# Patient Record
Sex: MALE | Race: WHITE | URBAN - METROPOLITAN AREA
[De-identification: names, ages, dates, MRNs, and addresses within clinical notes are randomized per-mention and may not be internally consistent; named-entity substitution may affect disease eponyms.]

---

## 2017-08-22 ENCOUNTER — APPOINTMENT (OUTPATIENT)
Dept: PHYSICAL THERAPY | Age: 57
End: 2017-08-22

## 2017-08-25 ENCOUNTER — HOSPITAL ENCOUNTER (OUTPATIENT)
Dept: PHYSICAL THERAPY | Age: 57
End: 2017-08-25

## 2017-10-03 ENCOUNTER — HOSPITAL ENCOUNTER (OUTPATIENT)
Dept: PHYSICAL THERAPY | Age: 57
Discharge: HOME | End: 2017-10-03
Payer: OTHER GOVERNMENT

## 2017-10-03 PROCEDURE — 97161 PT EVAL LOW COMPLEX 20 MIN: CPT

## 2017-10-03 PROCEDURE — 97110 THERAPEUTIC EXERCISES: CPT

## 2017-10-03 NOTE — PROGRESS NOTES
Valencia Muñoz  : 1960 [facility] at [facility], [address] [facility], [address]  [phone]   [phone]              OUTPATIENT PHYSICAL THERAPY:Initial Assessment 10/3/2017    ICD-10: Treatment Diagnosis:   Pain in right hip (M25.551)  Other abnormalities of gait and mobility (R26.89)   Stiffness of right hip, not elsewhere classified (M25.651)     Low back pain (M54.5)      Precautions/Allergies:   Review of patient's allergies indicates no known allergies. Fall Risk Score: 3 (? 5 = High Risk)  MD Orders: Eval and Treat  MEDICAL/REFERRING DIAGNOSIS:  Pain in unspecified hip [M25.559]  Low back pain [M54.5]   DATE OF ONSET: long term with exacerbation over the past 7-8 years  REFERRING PHYSICIAN: Kimberly Dan MD  RETURN PHYSICIAN APPOINTMENT: TBD by patient      INITIAL ASSESSMENT:  Valencia Muñoz has attended 1 physical therapy session including initial evaluation. Valencia Muñzo presents with S/S of decreased ROM, decreased strength, impaired gait, impaired stairs, impaired transfers, and sleep disruption due to right hip pain. Valencia Muñoz will benefit from skilled PT (medically necessary) to address above deficits affecting participation in basic ADLs and overall functional tolerance. PROBLEM LIST (Impacting functional limitations):  1. Decreased Strength  2. Decreased ADL/Functional Activities  3. Decreased Transfer Abilities  4. Decreased Ambulation Ability/Technique  5. Decreased Balance  6. Increased Pain  7. Decreased Activity Tolerance  8. Decreased Flexibility/Joint Mobility  9. Decreased Knowledge of Precautions  10. Decreased [place] with Home Exercise Program INTERVENTIONS PLANNED:  1. Balance Exercise  2. Bed Mobility  3. Cold  4. Cryotherapy  5. Electrical Stimulation  6. Family Education  7. Gait Training  8. Heat  9. Home Exercise Program (HEP)  10.  Manual Therapy  11. Neuromuscular Re-education/Strengthening  12. Range of Motion (ROM)  13. Therapeutic Activites  14. Therapeutic Exercise/Strengthening  15. Transfer Training  16. Ultrasound (US)   TREATMENT PLAN:  Effective Dates: 10/3/2017 TO 1/3/2018. Frequency/Duration: 2 times a week for 6 weeks  GOALS: (Goals have been discussed and agreed upon with patient.)  Short Term Goals 3 weeks  1. Zahra Smith will be independent with HEP for strength and ROM  2. Zahra Smith will participate in LE strengthening exercises for hip, knee, ankle with weight as appropriate for 3 sets of 10.  [address] will report <=5/10 pain with don/doffing clothing with minimal to no difficulty   4. Zahra Smith will demonstrate improvement of MMT from initial eval to 4+/5 B LE in order to return to participate in ADLs with minimal to no difficulty. [address] will participate in static and dynamic balance activities for 5 minutes to help improve proprioception and decrease risk of falls   6. Zahra Smith to increase lower extremity functional scale by 10 points to show improvement in areas of difficulty  Long Term Goals 8 weeks   1. Zahra Smith will be independent in HEP of stretching and strengthening   2. Zahra Smith will be able to perform sit to stand transfers independently with increased knee flexion and decreased use of upper extremities   3. Zahra Smith will ascend/descend 12 steps with reciprocal gait pattern and rail   4. Zahra Smith to increase lower extremity functional scale by 20 points to show improvement in areas of difficulty  Rehabilitation Potential For Stated Goals: Good  Regarding Aldo Parisi Emre's therapy, I certify that the treatment plan above will be carried out by a therapist or under their direction.   Thank you for this referral,  Carlos Ivy, PT     Referring Physician Signature: Frank Dugan MD              Date                    HISTORY:   History of Present Injury/Illness (Reason for Referral):  Mr. Tiffanie Simeon reports gradually increasing right hip pain, right lumbar pain, right hip stiffness, and difficulty with gait, balance, and stair negotiation with home entry. He reports difficulty with home care duties gait, and dressing. His goals are to improve his symptoms for decreased difficulty with going up stairs to check on his grandson who lives with him and has a diagnosis of epileptic seizures. Past Medical History/Comorbidities:   Mr. Tiffanie Simeon  has a past medical history of Endocrine disease; Hypertension; and Other ill-defined conditions. Mr. Tiffanie Simeon  has a past surgical history that includes appendectomy. Social History/Living Environment:     lives in a two story residence with his wife and his grandson (who he helps provide care for)  Prior Level of Function/Work/Activity:  Worked as a manual . Currently on disability    Current Medications:    Current Outpatient Prescriptions:     lisinopril (PRINIVIL, ZESTRIL) 10 mg tablet, Take 10 mg by mouth daily. , Disp: , Rfl:     levothyroxine (SYNTHROID) 125 mcg tablet, Take 125 mcg by mouth daily (before breakfast). , Disp: , Rfl:     clonazepam (KLONOPIN) 1 mg tablet, Take 1 mg by mouth two (2) times a day., Disp: , Rfl:     naproxen (NAPROSYN) 500 mg tablet, Take 500 mg by mouth as needed. , Disp: , Rfl:     fluoxetine (PROZAC) 10 mg capsule, Take 10 mg by mouth daily. , Disp: , Rfl:     oxycodone-acetaminophen (PERCOCET 7.5) 7.5-325 mg per tablet, Take 1 Tab by mouth every four (4) hours as needed for Pain for 15 doses. , Disp: 15 Tab, Rfl: 0     Date Last Reviewed:  10/3/2017   Number of Personal Factors/Comorbidities that affect the Plan of Care: 3+: HIGH COMPLEXITY   EXAMINATION:   Observation/Orthostatic Postural Assessment:  Assessed @ Initial Visit: ambulates with antalgic pattern on right with flexed hip position and mild right trendelenberg. Palpation:  Assessed @ Initial Visit: Tenderness to palpation over the right glut and lumbar paraspinals  ROM: Assessed @ Initial Visit:  Limited right quad and right hamstring ROM  Right hip flexion 95 with hard stop   Right hip extension -10   Right hip abd 20   Right hip ER 20   Right hip IR 5   Lumbar spinal flexion 50%   Lumbar extension 25%                     Strength: Assessed @ Initial Visit   Knee flexion-left 5   Knee flexion-right  5   Knee extension-left 5   Knee extension-right 5   Hip flexion-left 5   Hip flexion-right 4   Hip abduction-left 5   Hip abduction-right 3+   Hip extension-left 4+   Hip extension-right 4   Ankle dorsiflexion-left  5   Ankle dorsiflexion-right 5   Great toe extension-left 5   Great toe extension-right 5   Ankle plantarflexion-left 5   Ankle plantarflexion-right 5       Special Tests: Assessed @ Initial Visit    Giovana's Test: +right   Scouring Test:  +right     Neurological Screen: Assessed @ Initial Visit: decreased straight leg raise with pain in posterior thigh on right  Functional Mobility:  Assessed @ Initial Visit: impaired sit-stand transfer for chair and supine-sit in bed. Balance:  Assessed @ Initial Visit: impaired single limb stance on right     Body Structures Involved:  1. Nerves  2. Joints  3. Muscles  4. Ligaments Body Functions Affected:  1. Sensory/Pain  2. Neuromusculoskeletal  3. Movement Related Activities and Participation Affected:  1. General Tasks and Demands  2. Mobility  3. Self Care  4. Domestic Life  5. Interpersonal Interactions and Relationships  6. Community, Social and [place]   Number of elements that affect the Plan of Care: 3: MODERATE COMPLEXITY   CLINICAL PRESENTATION:   Presentation: Evolving clinical presentation with changing clinical characteristics: MODERATE COMPLEXITY   CLINICAL DECISION MAKING:   Outcome Measure:    Tool Used: Lower Extremity Functional Scale (LEFS)  Score:  Initial: 73/80 Most Recent: X/80 (Date: -- )   Interpretation of Score: 20 questions each scored on a 5 point scale with 0 representing \"extreme difficulty or unable to perform\" and 4 representing \"no difficulty\". The lower the score, the greater the functional disability. 80/80 represents no disability. Minimal detectable change is 9 points. Score 80 79-63 62-48 47-32 31-16 15-1 0   Modifier CH CI CJ CK CL CM CN     ? Mobility - Walking and Moving Around:     - CURRENT STATUS: CI - 1%-19% impaired, limited or restricted    - GOAL STATUS: CH - 0% impaired, limited or restricted    - D/C STATUS:  ---------------To be determined---------------      Medical Necessity:   · Skilled intervention continues to be required due to deficits and impairments seen upon initial evaluation affecting patient's participation in ADLs and functional tasks. Reason for Services/Other Comments:  · Patient continues to require skilled intervention due to deficits and impairments seen upon initial evaluation affecting patient's participation in ADLs and functional tasks. Use of outcome tool(s) and clinical judgement create a POC that gives a: Clear prediction of patient's progress: LOW COMPLEXITY   TREATMENT:   (In addition to Assessment/Re-Assessment sessions the following treatments were rendered)  THERAPEUTIC EXERCISE: (20 minutes):  Exercises per grid below to improve mobility, strength and balance. Required minimal visual, verbal and manual cues to promote proper body alignment, promote proper body posture and promote proper body mechanics. Progressed resistance, range and complexity of movement as indicated.    Date:  10/4/2017 Date:   Date:     Activity/Exercise Parameters Parameters Parameters   Bridge with adduction 2x10     Prone hip ext with pillow in neutral 2x10     Sidelying clamshell 2x10     Prone quad stretch 3x30\"     Supine delfino stretch 3x30\"     Supine hip traction x5'               MANUAL THERAPY: (0 minutes): Joint mobilization, Soft tissue mobilization and Manipulation was utilized and necessary because of the patient's restricted joint motion and restricted motion of soft tissue. MODALITIES: ( minutes):      Pre-Treatment Assessment:See patient history  Treatment/Session Assessment:Patient had improved stiffness with gait after treatment. · Pain/ Symptoms: Initial:   7-8/10 Post Session:  5-6/10 ·   Compliance with Program/Exercises: Will assess as treatment progresses. · Recommendations/Intent for next treatment session: \"Next visit will focus on advancements to more challenging activities\".   Total Treatment Duration:PT Patient Time In/Time Out  Time In: 1430  Time Out: 16 [facility], PT

## 2017-10-05 ENCOUNTER — HOSPITAL ENCOUNTER (OUTPATIENT)
Dept: PHYSICAL THERAPY | Age: 57
Discharge: HOME | End: 2017-10-05
Payer: OTHER GOVERNMENT

## 2017-10-05 PROCEDURE — 97140 MANUAL THERAPY 1/> REGIONS: CPT

## 2017-10-05 PROCEDURE — 97110 THERAPEUTIC EXERCISES: CPT

## 2017-10-05 NOTE — PROGRESS NOTES
Cristian Cobos  : 1960 Therapy Center at [facility]  [address] [facility], [address] [place] [address], [address]  [phone]   [phone]              OUTPATIENT PHYSICAL THERAPY:Daily Note 10/5/2017    ICD-10: Treatment Diagnosis:   Pain in right hip (M25.551)  Other abnormalities of gait and mobility (R26.89)   Stiffness of right hip, not elsewhere classified (M25.651)     Low back pain (M54.5)      Precautions/Allergies:   Review of patient's allergies indicates no known allergies. Fall Risk Score: 3 (? 5 = High Risk)  MD Orders: Eval and Treat  MEDICAL/REFERRING DIAGNOSIS:  Pain in unspecified hip [M25.559]  Low back pain [M54.5]   DATE OF ONSET: long term with exacerbation over the past 7-8 years  REFERRING PHYSICIAN: Curtis De La Garza MD  RETURN PHYSICIAN APPOINTMENT: TBD by patient      INITIAL ASSESSMENT:  Cristian Cobos has attended 1 physical therapy session including initial evaluation. Cristian Cobos presents with S/S of decreased ROM, decreased strength, impaired gait, impaired stairs, impaired transfers, and sleep disruption due to right hip pain. Cristian Cobos will benefit from skilled PT (medically necessary) to address above deficits affecting participation in basic ADLs and overall functional tolerance. PROBLEM LIST (Impacting functional limitations):  1. Decreased Strength  2. Decreased ADL/Functional Activities  3. Decreased Transfer Abilities  4. Decreased Ambulation Ability/Technique  5. Decreased Balance  6. Increased Pain  7. Decreased Activity Tolerance  8. Decreased Flexibility/Joint Mobility  9. Decreased Knowledge of Precautions  10. Decreased [place] with Home Exercise Program INTERVENTIONS PLANNED:  1. Balance Exercise  2. Bed Mobility  3. Cold  4. Cryotherapy  5. Electrical Stimulation  6. Family Education  7. Gait Training  8. Heat  9. Home Exercise Program (HEP)  10. Manual Therapy  11.  Neuromuscular Re-education/Strengthening  12. Range of Motion (ROM)  13. Therapeutic Activites  14. Therapeutic Exercise/Strengthening  15. Transfer Training  16. Ultrasound (US)   TREATMENT PLAN:  Effective Dates: 10/3/2017 TO 1/3/2018. Frequency/Duration: 2 times a week for 6 weeks  GOALS: (Goals have been discussed and agreed upon with patient.)  Short Term Goals 3 weeks  1. Bridget Corado will be independent with HEP for strength and ROM  2. Bridget Corado will participate in LE strengthening exercises for hip, knee, ankle with weight as appropriate for 3 sets of 10.  [address] will report <=5/10 pain with don/doffing clothing with minimal to no difficulty   4. Bridget Corado will demonstrate improvement of MMT from initial eval to 4+/5 B LE in order to return to participate in ADLs with minimal to no difficulty. [address] will participate in static and dynamic balance activities for 5 minutes to help improve proprioception and decrease risk of falls   6. Bridget Corado to increase lower extremity functional scale by 10 points to show improvement in areas of difficulty  Long Term Goals 8 weeks   1. Bridget Corado will be independent in HEP of stretching and strengthening   2. Bridget Coardo will be able to perform sit to stand transfers independently with increased knee flexion and decreased use of upper extremities   3. Bridget Corado will ascend/descend 12 steps with reciprocal gait pattern and rail   4. Bridget Corado to increase lower extremity functional scale by 20 points to show improvement in areas of difficulty  Rehabilitation Potential For Stated Goals: Good  Regarding Jeromy Andrea's therapy, I certify that the treatment plan above will be carried out by a therapist or under their direction.   Thank you for this referral,  Elyssa Zazueta PT     Referring Physician Signature: Hilda Benavides MD Date                    HISTORY:   History of Present Injury/Illness (Reason for Referral):  Mr. Amrit Campbell reports gradually increasing right hip pain, right lumbar pain, right hip stiffness, and difficulty with gait, balance, and stair negotiation with home entry. He reports difficulty with home care duties gait, and dressing. His goals are to improve his symptoms for decreased difficulty with going up stairs to check on his grandson who lives with him and has a diagnosis of epileptic seizures. Past Medical History/Comorbidities:   Mr. Amrit Campbell  has a past medical history of Endocrine disease; Hypertension; and Other ill-defined conditions. Mr. Amrit Campbell  has a past surgical history that includes appendectomy. Social History/Living Environment:     lives in a two story residence with his wife and his grandson (who he helps provide care for)  Prior Level of Function/Work/Activity:  Worked as a manual . Currently on disability    Current Medications:    Current Outpatient Prescriptions:     lisinopril (PRINIVIL, ZESTRIL) 10 mg tablet, Take 10 mg by mouth daily. , Disp: , Rfl:     levothyroxine (SYNTHROID) 125 mcg tablet, Take 125 mcg by mouth daily (before breakfast). , Disp: , Rfl:     clonazepam (KLONOPIN) 1 mg tablet, Take 1 mg by mouth two (2) times a day., Disp: , Rfl:     naproxen (NAPROSYN) 500 mg tablet, Take 500 mg by mouth as needed. , Disp: , Rfl:     fluoxetine (PROZAC) 10 mg capsule, Take 10 mg by mouth daily. , Disp: , Rfl:     oxycodone-acetaminophen (PERCOCET 7.5) 7.5-325 mg per tablet, Take 1 Tab by mouth every four (4) hours as needed for Pain for 15 doses. , Disp: 15 Tab, Rfl: 0     Date Last Reviewed:  10/5/2017   Number of Personal Factors/Comorbidities that affect the Plan of Care: 3+: HIGH COMPLEXITY   EXAMINATION:   Observation/Orthostatic Postural Assessment:  Assessed @ Initial Visit: ambulates with antalgic pattern on right with flexed hip position and mild right trendelenberg. Palpation:  Assessed @ Initial Visit: Tenderness to palpation over the right glut and lumbar paraspinals  ROM: Assessed @ Initial Visit:  Limited right quad and right hamstring ROM  Right hip flexion 95 with hard stop   Right hip extension -10   Right hip abd 20   Right hip ER 20   Right hip IR 5   Lumbar spinal flexion 50%   Lumbar extension 25%                     Strength: Assessed @ Initial Visit   Knee flexion-left 5   Knee flexion-right  5   Knee extension-left 5   Knee extension-right 5   Hip flexion-left 5   Hip flexion-right 4   Hip abduction-left 5   Hip abduction-right 3+   Hip extension-left 4+   Hip extension-right 4   Ankle dorsiflexion-left  5   Ankle dorsiflexion-right 5   Great toe extension-left 5   Great toe extension-right 5   Ankle plantarflexion-left 5   Ankle plantarflexion-right 5       Special Tests: Assessed @ Initial Visit    Giovana's Test: +right   Scouring Test:  +right     Neurological Screen: Assessed @ Initial Visit: decreased straight leg raise with pain in posterior thigh on right  Functional Mobility:  Assessed @ Initial Visit: impaired sit-stand transfer for chair and supine-sit in bed. Balance:  Assessed @ Initial Visit: impaired single limb stance on right     Body Structures Involved:  1. Nerves  2. Joints  3. Muscles  4. Ligaments Body Functions Affected:  1. Sensory/Pain  2. Neuromusculoskeletal  3. Movement Related Activities and Participation Affected:  1. General Tasks and Demands  2. Mobility  3. Self Care  4. Domestic Life  5. Interpersonal Interactions and Relationships  6. Community, Social and [place]   Number of elements that affect the Plan of Care: 3: MODERATE COMPLEXITY   CLINICAL PRESENTATION:   Presentation: Evolving clinical presentation with changing clinical characteristics: MODERATE COMPLEXITY   CLINICAL DECISION MAKING:   Outcome Measure:    Tool Used: Lower Extremity Functional Scale (LEFS)  Score:  Initial: 73/80 Most Recent: X/80 (Date: -- )   Interpretation of Score: 20 questions each scored on a 5 point scale with 0 representing \"extreme difficulty or unable to perform\" and 4 representing \"no difficulty\". The lower the score, the greater the functional disability. 80/80 represents no disability. Minimal detectable change is 9 points. Score 80 79-63 62-48 47-32 31-16 15-1 0   Modifier CH CI CJ CK CL CM CN     ? Mobility - Walking and Moving Around:     - CURRENT STATUS: CI - 1%-19% impaired, limited or restricted    - GOAL STATUS: CH - 0% impaired, limited or restricted    - D/C STATUS:  ---------------To be determined---------------      Medical Necessity:   · Skilled intervention continues to be required due to deficits and impairments seen upon initial evaluation affecting patient's participation in ADLs and functional tasks. Reason for Services/Other Comments:  · Patient continues to require skilled intervention due to deficits and impairments seen upon initial evaluation affecting patient's participation in ADLs and functional tasks. Use of outcome tool(s) and clinical judgement create a POC that gives a: Clear prediction of patient's progress: LOW COMPLEXITY   TREATMENT:   (In addition to Assessment/Re-Assessment sessions the following treatments were rendered)  THERAPEUTIC EXERCISE: (30 minutes):  Exercises per grid below to improve mobility, strength and balance. Required minimal visual, verbal and manual cues to promote proper body alignment, promote proper body posture and promote proper body mechanics. Progressed resistance, range and complexity of movement as indicated.    Date:  10/3/2017 Date:  10/5/2017 Date:     Activity/Exercise Parameters Parameters Parameters   Bridge with adduction 2x10 2x10    Prone hip ext with pillow in neutral 2x10 2x10    Sidelying clamshell 2x10 2x10@ 2#    Prone quad stretch 3x30\" 3x30\"    Supine delfino stretch 3x30\" 3x30\"    Supine hip traction x5'     Nu-step  x10'    5    MANUAL THERAPY: (15 minutes): Joint mobilization, Soft tissue mobilization and Manipulation was utilized and necessary because of the patient's restricted joint motion and restricted motion of soft tissue. Right hip lateral joint distraction with physiologic hip IR and ER. Right lateral hip inferior glide with physiologic flexion  MODALITIES: ( minutes):      Pre-Treatment Assessment: Patient reports improved pain with HEP. Treatment/Session Assessment:Patient had improved stiffness with gait after treatment. · Pain/ Symptoms: Initial:   6/10 Post Session:  4/10 ·   Compliance with Program/Exercises: Will assess as treatment progresses. · Recommendations/Intent for next treatment session: \"Next visit will focus on advancements to more challenging activities\".   Total Treatment Duration:PT Patient Time In/Time Out  Time In: 8117  Time Out: [address], PT

## 2017-10-05 NOTE — PROGRESS NOTES
Ambulatory/Rehab Services H2 Model Falls Risk Assessment    Risk Factor Pts. ·   Confusion/Disorientation/Impulsivity  []    4 ·   Symptomatic Depression  []   2 ·   Altered Elimination  []   1 ·   Dizziness/Vertigo  []   1 ·   Gender (Male)  [x]   1 ·   Any administered antiepileptics (anticonvulsants):  []   2 ·   Any administered benzodiazepines:  []   1 ·   Visual Impairment (specify):  []   1 ·   Portable Oxygen Use  []   1 ·   Orthostatic ? BP  []   1 ·   History of Recent Falls (within 3 mos.)  []   5     Ability to Rise from Chair (choose one) Pts. ·   Ability to rise in a single movement  []   0 ·   Pushes up, successful in one attempt  [x]   1 ·   Multiple attempts, but successful  []   3 ·   Unable to rise without assistance  []   4   Total: (5 or greater = High Risk) 2     Falls Prevention Plan:   []                Physical Limitations to Exercise (specify):   []                Mobility Assistance Device (type):   []                Exercise/Equipment Adaptation (specify):    ©2010 [facility] [address] Patent #[id number].  Federal Law prohibits the replication, distribution or use without written permission from [facility] [employer]

## 2017-10-10 ENCOUNTER — HOSPITAL ENCOUNTER (OUTPATIENT)
Dept: PHYSICAL THERAPY | Age: 57
Discharge: HOME | End: 2017-10-10
Payer: OTHER GOVERNMENT

## 2017-10-10 NOTE — PROGRESS NOTES
Mendez Doss  : 1960  Payor: [employer] [place] / Plan: [address] / Product Type: Federal Funded Programs /  [address] [place] [address]  at [facility], [facility], [address]  [phone]   [phone]        OUTPATIENT DAILY NOTE    NAME/AGE/GENDER: Mendez Doss is a 62 y.o. male. DATE: 10/10/2017    Patient cancelled  for appointment today due to a conflicting doctors appointment. Will plan to follow up on next scheduled visit.     Rambo Dillon, PT

## 2017-10-12 ENCOUNTER — HOSPITAL ENCOUNTER (OUTPATIENT)
Dept: PHYSICAL THERAPY | Age: 57
Discharge: HOME | End: 2017-10-12
Payer: OTHER GOVERNMENT

## 2017-10-12 PROCEDURE — 97110 THERAPEUTIC EXERCISES: CPT

## 2017-10-12 NOTE — PROGRESS NOTES
Estela Wahl  : 1960 [facility]  [address] [facility], [address] [place] [address], [address]  [phone]   [phone]              OUTPATIENT PHYSICAL THERAPY:Daily Note 10/12/2017    ICD-10: Treatment Diagnosis:   Pain in right hip (M25.551)  Other abnormalities of gait and mobility (R26.89)   Stiffness of right hip, not elsewhere classified (M25.651)     Low back pain (M54.5)      Precautions/Allergies:   Review of patient's allergies indicates no known allergies. Fall Risk Score: 3 (? 5 = High Risk)  MD Orders: Eval and Treat  MEDICAL/REFERRING DIAGNOSIS:  Pain in unspecified hip [M25.559]  Low back pain [M54.5]   DATE OF ONSET: long term with exacerbation over the past 7-8 years  REFERRING PHYSICIAN: Jeison Go MD  RETURN PHYSICIAN APPOINTMENT: TBD by patient      INITIAL ASSESSMENT:  Estela Wahl has attended 1 physical therapy session including initial evaluation. Estela Wahl presents with S/S of decreased ROM, decreased strength, impaired gait, impaired stairs, impaired transfers, and sleep disruption due to right hip pain. Estela Wahl will benefit from skilled PT (medically necessary) to address above deficits affecting participation in basic ADLs and overall functional tolerance. PROBLEM LIST (Impacting functional limitations):  1. Decreased Strength  2. Decreased ADL/Functional Activities  3. Decreased Transfer Abilities  4. Decreased Ambulation Ability/Technique  5. Decreased Balance  6. Increased Pain  7. Decreased Activity Tolerance  8. Decreased Flexibility/Joint Mobility  9. Decreased Knowledge of Precautions  10. Decreased [place] with Home Exercise Program INTERVENTIONS PLANNED:  1. Balance Exercise  2. Bed Mobility  3. Cold  4. Cryotherapy  5. Electrical Stimulation  6. Family Education  7. Gait Training  8. Heat  9. Home Exercise Program (HEP)  10. Manual Therapy  11.  Neuromuscular Re-education/Strengthening  12. Range of Motion (ROM)  13. Therapeutic Activites  14. Therapeutic Exercise/Strengthening  15. Transfer Training  16. Ultrasound (US)   TREATMENT PLAN:  Effective Dates: 10/3/2017 TO 1/3/2018. Frequency/Duration: 2 times a week for 6 weeks  GOALS: (Goals have been discussed and agreed upon with patient.)  Short Term Goals 3 weeks  1. Zahra Smith will be independent with HEP for strength and ROM  2. Zahra Smith will participate in LE strengthening exercises for hip, knee, ankle with weight as appropriate for 3 sets of 10.  [address] will report <=5/10 pain with don/doffing clothing with minimal to no difficulty   4. Zahra Smith will demonstrate improvement of MMT from initial eval to 4+/5 B LE in order to return to participate in ADLs with minimal to no difficulty. [address] will participate in static and dynamic balance activities for 5 minutes to help improve proprioception and decrease risk of falls   6. Zahra Smith to increase lower extremity functional scale by 10 points to show improvement in areas of difficulty  Long Term Goals 8 weeks   1. Zahra Smith will be independent in HEP of stretching and strengthening   2. Zahra Smith will be able to perform sit to stand transfers independently with increased knee flexion and decreased use of upper extremities   3. Zahra Smith will ascend/descend 12 steps with reciprocal gait pattern and rail   4. Zahra Smith to increase lower extremity functional scale by 20 points to show improvement in areas of difficulty  Rehabilitation Potential For Stated Goals: Good  Regarding Aldo Andrea's therapy, I certify that the treatment plan above will be carried out by a therapist or under their direction.   Thank you for this referral,  Carlos Ivy PT     Referring Physician Signature: Abigail Webb MD Date                    HISTORY:   History of Present Injury/Illness (Reason for Referral):  Mr. Edgardo French reports gradually increasing right hip pain, right lumbar pain, right hip stiffness, and difficulty with gait, balance, and stair negotiation with home entry. He reports difficulty with home care duties gait, and dressing. His goals are to improve his symptoms for decreased difficulty with going up stairs to check on his grandson who lives with him and has a diagnosis of epileptic seizures. Past Medical History/Comorbidities:   Mr. Edgardo French  has a past medical history of Endocrine disease; Hypertension; and Other ill-defined conditions. Mr. Edgardo French  has a past surgical history that includes appendectomy. Social History/Living Environment:     lives in a two story residence with his wife and his grandson (who he helps provide care for)  Prior Level of Function/Work/Activity:  Worked as a manual . Currently on disability    Current Medications:    Current Outpatient Prescriptions:     lisinopril (PRINIVIL, ZESTRIL) 10 mg tablet, Take 10 mg by mouth daily. , Disp: , Rfl:     levothyroxine (SYNTHROID) 125 mcg tablet, Take 125 mcg by mouth daily (before breakfast). , Disp: , Rfl:     clonazepam (KLONOPIN) 1 mg tablet, Take 1 mg by mouth two (2) times a day., Disp: , Rfl:     naproxen (NAPROSYN) 500 mg tablet, Take 500 mg by mouth as needed. , Disp: , Rfl:     fluoxetine (PROZAC) 10 mg capsule, Take 10 mg by mouth daily. , Disp: , Rfl:     oxycodone-acetaminophen (PERCOCET 7.5) 7.5-325 mg per tablet, Take 1 Tab by mouth every four (4) hours as needed for Pain for 15 doses. , Disp: 15 Tab, Rfl: 0     Date Last Reviewed:  10/12/2017   Number of Personal Factors/Comorbidities that affect the Plan of Care: 3+: HIGH COMPLEXITY   EXAMINATION:   Observation/Orthostatic Postural Assessment:  Assessed @ Initial Visit: ambulates with antalgic pattern on right with flexed hip position and mild right trendelenberg. Palpation:  Assessed @ Initial Visit: Tenderness to palpation over the right glut and lumbar paraspinals  ROM: Assessed @ Initial Visit:  Limited right quad and right hamstring ROM  Right hip flexion 95 with hard stop   Right hip extension -10   Right hip abd 20   Right hip ER 20   Right hip IR 5   Lumbar spinal flexion 50%   Lumbar extension 25%                     Strength: Assessed @ Initial Visit   Knee flexion-left 5   Knee flexion-right  5   Knee extension-left 5   Knee extension-right 5   Hip flexion-left 5   Hip flexion-right 4   Hip abduction-left 5   Hip abduction-right 3+   Hip extension-left 4+   Hip extension-right 4   Ankle dorsiflexion-left  5   Ankle dorsiflexion-right 5   Great toe extension-left 5   Great toe extension-right 5   Ankle plantarflexion-left 5   Ankle plantarflexion-right 5       Special Tests: Assessed @ Initial Visit    Giovana's Test: +right   Scouring Test:  +right     Neurological Screen: Assessed @ Initial Visit: decreased straight leg raise with pain in posterior thigh on right  Functional Mobility:  Assessed @ Initial Visit: impaired sit-stand transfer for chair and supine-sit in bed. Balance:  Assessed @ Initial Visit: impaired single limb stance on right     Body Structures Involved:  1. Nerves  2. Joints  3. Muscles  4. Ligaments Body Functions Affected:  1. Sensory/Pain  2. Neuromusculoskeletal  3. Movement Related Activities and Participation Affected:  1. General Tasks and Demands  2. Mobility  3. Self Care  4. Domestic Life  5. Interpersonal Interactions and Relationships  6. Community, Social and [place]   Number of elements that affect the Plan of Care: 3: MODERATE COMPLEXITY   CLINICAL PRESENTATION:   Presentation: Evolving clinical presentation with changing clinical characteristics: MODERATE COMPLEXITY   CLINICAL DECISION MAKING:   Outcome Measure:    Tool Used: Lower Extremity Functional Scale (LEFS)  Score:  Initial: 73/80 Most Recent: X/80 (Date: -- )   Interpretation of Score: 20 questions each scored on a 5 point scale with 0 representing \"extreme difficulty or unable to perform\" and 4 representing \"no difficulty\". The lower the score, the greater the functional disability. 80/80 represents no disability. Minimal detectable change is 9 points. Score 80 79-63 62-48 47-32 31-16 15-1 0   Modifier CH CI CJ CK CL CM CN     ? Mobility - Walking and Moving Around:     - CURRENT STATUS: CI - 1%-19% impaired, limited or restricted    - GOAL STATUS: CH - 0% impaired, limited or restricted    - D/C STATUS:  ---------------To be determined---------------      Medical Necessity:   · Skilled intervention continues to be required due to deficits and impairments seen upon initial evaluation affecting patient's participation in ADLs and functional tasks. Reason for Services/Other Comments:  · Patient continues to require skilled intervention due to deficits and impairments seen upon initial evaluation affecting patient's participation in ADLs and functional tasks. Use of outcome tool(s) and clinical judgement create a POC that gives a: Clear prediction of patient's progress: LOW COMPLEXITY   TREATMENT:   (In addition to Assessment/Re-Assessment sessions the following treatments were rendered)  THERAPEUTIC EXERCISE: (40 minutes):  Exercises per grid below to improve mobility, strength and balance. Required minimal visual, verbal and manual cues to promote proper body alignment, promote proper body posture and promote proper body mechanics. Progressed resistance, range and complexity of movement as indicated.    Date:  10/3/2017 Date:  10/5/2017 Date:  10/12/2017   Activity/Exercise Parameters Parameters Parameters   Bridge with adduction 2x10 2x10 2x10   Prone hip ext with pillow in neutral 2x10 2x10 2x10   Sidelying alli 2x10 2x10@ 2# [email or web address]   Prone quad stretch 3x30\" 3x30\" 3x30\"   Supine delfino stretch 3x30\" 3x30\" 3x30\"   Supine hip traction x5'     Nu-step  x10' X10\" l2   Step ups   6\" 2x10B   Balance board   x2'ea   Standing hip ext and abd   ytb 2x10ea                         MANUAL THERAPY: (0 minutes): Joint mobilization, Soft tissue mobilization and Manipulation was utilized and necessary because of the patient's restricted joint motion and restricted motion of soft tissue. Right hip lateral joint distraction with physiologic hip IR and ER. Right lateral hip inferior glide with physiologic flexion  MODALITIES: ( minutes):      Pre-Treatment Assessment: Patient reports improved pain with HEP. Reports increased symptoms after having to walk up hill at [employer] yesterday  Treatment/Session Assessment:Patient had improved stiffness with gait after treatment. · Pain/ Symptoms: Initial:   6/10 Post Session:  4/10 ·   Compliance with Program/Exercises: Will assess as treatment progresses. · Recommendations/Intent for next treatment session: \"Next visit will focus on advancements to more challenging activities\".   Total Treatment Duration:PT Patient Time In/Time Out  Time In: 1430  Time Out: 16 [facility], PT

## 2017-10-17 ENCOUNTER — HOSPITAL ENCOUNTER (OUTPATIENT)
Dept: PHYSICAL THERAPY | Age: 57
Discharge: HOME | End: 2017-10-17
Payer: OTHER GOVERNMENT

## 2017-10-17 PROCEDURE — 97110 THERAPEUTIC EXERCISES: CPT

## 2017-10-17 NOTE — PROGRESS NOTES
Tonya Zelaya  : 1960 [facility], [address] [facility] [place], [address] [place] [address]  [phone]   [phone]              OUTPATIENT PHYSICAL THERAPY:Daily Note 10/17/2017    ICD-10: Treatment Diagnosis:   Pain in right hip (M25.551)  Other abnormalities of gait and mobility (R26.89)   Stiffness of right hip, not elsewhere classified (M25.651)     Low back pain (M54.5)      Precautions/Allergies:   Review of patient's allergies indicates no known allergies. Fall Risk Score: 3 (? 5 = High Risk)  MD Orders: Eval and Treat  MEDICAL/REFERRING DIAGNOSIS:  Pain in unspecified hip [M25.559]  Low back pain [M54.5]   DATE OF ONSET: long term with exacerbation over the past 7-8 years  REFERRING PHYSICIAN: Stephanie Parr MD  RETURN PHYSICIAN APPOINTMENT: TBD by patient      INITIAL ASSESSMENT:  Tonya Zelaya has attended 1 physical therapy session including initial evaluation. Tonya Zelaya presents with S/S of decreased ROM, decreased strength, impaired gait, impaired stairs, impaired transfers, and sleep disruption due to right hip pain. Tonya Zelaya will benefit from skilled PT (medically necessary) to address above deficits affecting participation in basic ADLs and overall functional tolerance. PROBLEM LIST (Impacting functional limitations):  1. Decreased Strength  2. Decreased ADL/Functional Activities  3. Decreased Transfer Abilities  4. Decreased Ambulation Ability/Technique  5. Decreased Balance  6. Increased Pain  7. Decreased Activity Tolerance  8. Decreased Flexibility/Joint Mobility  9. Decreased Knowledge of Precautions  10. Decreased [place] with Home Exercise Program INTERVENTIONS PLANNED:  1. Balance Exercise  2. Bed Mobility  3. Cold  4. Cryotherapy  5. Electrical Stimulation  6. Family Education  7. Gait Training  8. Heat  9. Home Exercise Program (HEP)  10. Manual Therapy  11.  Neuromuscular Re-education/Strengthening  12. Range of Motion (ROM)  13. Therapeutic Activites  14. Therapeutic Exercise/Strengthening  15. Transfer Training  16. Ultrasound (US)   TREATMENT PLAN:  Effective Dates: 10/3/2017 TO 1/3/2018. Frequency/Duration: 2 times a week for 6 weeks  GOALS: (Goals have been discussed and agreed upon with patient.)  Short Term Goals 3 weeks  1. Fermin Check will be independent with HEP for strength and ROM  2. Fermin Check will participate in LE strengthening exercises for hip, knee, ankle with weight as appropriate for 3 sets of 10.  [address] will report <=5/10 pain with don/doffing clothing with minimal to no difficulty   4. Fermin Check will demonstrate improvement of MMT from initial eval to 4+/5 B LE in order to return to participate in ADLs with minimal to no difficulty. [address] will participate in static and dynamic balance activities for 5 minutes to help improve proprioception and decrease risk of falls   6. Fermin Check to increase lower extremity functional scale by 10 points to show improvement in areas of difficulty  Long Term Goals 8 weeks   1. Fermin Check will be independent in HEP of stretching and strengthening   2. Fermin Check will be able to perform sit to stand transfers independently with increased knee flexion and decreased use of upper extremities   3. Fermin Check will ascend/descend 12 steps with reciprocal gait pattern and rail   4. Fermin Check to increase lower extremity functional scale by 20 points to show improvement in areas of difficulty  Rehabilitation Potential For Stated Goals: Good  Regarding Jason Carrington Emre's therapy, I certify that the treatment plan above will be carried out by a therapist or under their direction.   Thank you for this referral,  Nathan Abdalla PT     Referring Physician Signature: Clearnce Cabot, MD Date                    HISTORY:   History of Present Injury/Illness (Reason for Referral):  Mr. Dolores Hernandez reports gradually increasing right hip pain, right lumbar pain, right hip stiffness, and difficulty with gait, balance, and stair negotiation with home entry. He reports difficulty with home care duties gait, and dressing. His goals are to improve his symptoms for decreased difficulty with going up stairs to check on his grandson who lives with him and has a diagnosis of epileptic seizures. Past Medical History/Comorbidities:   Mr. Dolores Hernandez  has a past medical history of Endocrine disease; Hypertension; and Other ill-defined conditions. Mr. Dolores Hernandez  has a past surgical history that includes appendectomy. Social History/Living Environment:     lives in a two story residence with his wife and his grandson (who he helps provide care for)  Prior Level of Function/Work/Activity:  Worked as a manual . Currently on disability    Current Medications:    Current Outpatient Prescriptions:     lisinopril (PRINIVIL, ZESTRIL) 10 mg tablet, Take 10 mg by mouth daily. , Disp: , Rfl:     levothyroxine (SYNTHROID) 125 mcg tablet, Take 125 mcg by mouth daily (before breakfast). , Disp: , Rfl:     clonazepam (KLONOPIN) 1 mg tablet, Take 1 mg by mouth two (2) times a day., Disp: , Rfl:     naproxen (NAPROSYN) 500 mg tablet, Take 500 mg by mouth as needed. , Disp: , Rfl:     fluoxetine (PROZAC) 10 mg capsule, Take 10 mg by mouth daily. , Disp: , Rfl:     oxycodone-acetaminophen (PERCOCET 7.5) 7.5-325 mg per tablet, Take 1 Tab by mouth every four (4) hours as needed for Pain for 15 doses. , Disp: 15 Tab, Rfl: 0     Date Last Reviewed:  10/17/2017   Number of Personal Factors/Comorbidities that affect the Plan of Care: 3+: HIGH COMPLEXITY   EXAMINATION:   Observation/Orthostatic Postural Assessment:  Assessed @ Initial Visit: ambulates with antalgic pattern on right with flexed hip position and mild right trendelenberg. Palpation:  Assessed @ Initial Visit: Tenderness to palpation over the right glut and lumbar paraspinals  ROM: Assessed @ Initial Visit:  Limited right quad and right hamstring ROM  Right hip flexion 95 with hard stop   Right hip extension -10   Right hip abd 20   Right hip ER 20   Right hip IR 5   Lumbar spinal flexion 50%   Lumbar extension 25%                     Strength: Assessed @ Initial Visit   Knee flexion-left 5   Knee flexion-right  5   Knee extension-left 5   Knee extension-right 5   Hip flexion-left 5   Hip flexion-right 4   Hip abduction-left 5   Hip abduction-right 3+   Hip extension-left 4+   Hip extension-right 4   Ankle dorsiflexion-left  5   Ankle dorsiflexion-right 5   Great toe extension-left 5   Great toe extension-right 5   Ankle plantarflexion-left 5   Ankle plantarflexion-right 5       Special Tests: Assessed @ Initial Visit    Giovana's Test: +right   Scouring Test:  +right     Neurological Screen: Assessed @ Initial Visit: decreased straight leg raise with pain in posterior thigh on right  Functional Mobility:  Assessed @ Initial Visit: impaired sit-stand transfer for chair and supine-sit in bed. Balance:  Assessed @ Initial Visit: impaired single limb stance on right     Body Structures Involved:  1. Nerves  2. Joints  3. Muscles  4. Ligaments Body Functions Affected:  1. Sensory/Pain  2. Neuromusculoskeletal  3. Movement Related Activities and Participation Affected:  1. General Tasks and Demands  2. Mobility  3. Self Care  4. Domestic Life  5. Interpersonal Interactions and Relationships  6. Community, Social and [place]   Number of elements that affect the Plan of Care: 3: MODERATE COMPLEXITY   CLINICAL PRESENTATION:   Presentation: Evolving clinical presentation with changing clinical characteristics: MODERATE COMPLEXITY   CLINICAL DECISION MAKING:   Outcome Measure:    Tool Used: Lower Extremity Functional Scale (LEFS)  Score:  Initial: 73/80 Most Recent: X/80 (Date: -- )   Interpretation of Score: 20 questions each scored on a 5 point scale with 0 representing \"extreme difficulty or unable to perform\" and 4 representing \"no difficulty\". The lower the score, the greater the functional disability. 80/80 represents no disability. Minimal detectable change is 9 points. Score 80 79-63 62-48 47-32 31-16 15-1 0   Modifier CH CI CJ CK CL CM CN     ? Mobility - Walking and Moving Around:     - CURRENT STATUS: CI - 1%-19% impaired, limited or restricted    - GOAL STATUS: CH - 0% impaired, limited or restricted    - D/C STATUS:  ---------------To be determined---------------      Medical Necessity:   · Skilled intervention continues to be required due to deficits and impairments seen upon initial evaluation affecting patient's participation in ADLs and functional tasks. Reason for Services/Other Comments:  · Patient continues to require skilled intervention due to deficits and impairments seen upon initial evaluation affecting patient's participation in ADLs and functional tasks. Use of outcome tool(s) and clinical judgement create a POC that gives a: Clear prediction of patient's progress: LOW COMPLEXITY   TREATMENT:   (In addition to Assessment/Re-Assessment sessions the following treatments were rendered)  THERAPEUTIC EXERCISE: (40 minutes):  Exercises per grid below to improve mobility, strength and balance. Required minimal visual, verbal and manual cues to promote proper body alignment, promote proper body posture and promote proper body mechanics. Progressed resistance, range and complexity of movement as indicated.    Date:  10/3/2017 Date:  10/5/2017 Date:  10/12/2017 Date:  10/17/2017   Activity/Exercise Parameters Parameters Parameters    Bridge with adduction 2x10 2x10 2x10 2x10   Prone hip ext with pillow in neutral 2x10 2x10 2x10 2x10   Sidelying alli 2x10 2x10@ 2# [email or web address] 2x10@2#   Prone quad stretch 3x30\" 3x30\" 3x30\" 3x30\"   Supine delfino stretch 3x30\" 3x30\" 3x30\" 3x30\"   Supine hip traction x5'      Nu-step  x10' X10\" l2 x10' L2   Step ups   6\" 2x10B 6\" 2x10B   Balance board   x2'ea x2'ea   Standing hip ext and abd   ytb 2x10ea ytb x20ea+ flexion and adduction   Tandem balance    2x1'ea                     MANUAL THERAPY: (0 minutes): Joint mobilization, Soft tissue mobilization and Manipulation was utilized and necessary because of the patient's restricted joint motion and restricted motion of soft tissue. Right hip lateral joint distraction with physiologic hip IR and ER. Right lateral hip inferior glide with physiologic flexion  MODALITIES: ( minutes):      Pre-Treatment Assessment: Patient reports that he has been feeling pretty good overall. Treatment/Session Assessment:Patient had improved stiffness with gait after treatment. · Pain/ Symptoms: Initial:   4/10 Post Session:  2/10 ·   Compliance with Program/Exercises: Will assess as treatment progresses. · Recommendations/Intent for next treatment session: \"Next visit will focus on advancements to more challenging activities\".   Total Treatment Duration:PT Patient Time In/Time Out  Time In: 1430  Time Out: 16 [facility], PT

## 2017-10-19 ENCOUNTER — HOSPITAL ENCOUNTER (OUTPATIENT)
Dept: PHYSICAL THERAPY | Age: 57
Discharge: HOME | End: 2017-10-19
Payer: OTHER GOVERNMENT

## 2017-10-19 PROCEDURE — 97110 THERAPEUTIC EXERCISES: CPT

## 2017-10-19 NOTE — PROGRESS NOTES
Vicky Barajas  : 1960 [facility]  [address], [address] [facility], [address]  [phone]   [phone]              OUTPATIENT PHYSICAL THERAPY:Daily Note 10/19/2017    ICD-10: Treatment Diagnosis:   Pain in right hip (M25.551)  Other abnormalities of gait and mobility (R26.89)   Stiffness of right hip, not elsewhere classified (M25.651)     Low back pain (M54.5)      Precautions/Allergies:   Review of patient's allergies indicates no known allergies. Fall Risk Score: 3 (? 5 = High Risk)  MD Orders: Eval and Treat  MEDICAL/REFERRING DIAGNOSIS:  Pain in unspecified hip [M25.559]  Low back pain [M54.5]   DATE OF ONSET: long term with exacerbation over the past 7-8 years  REFERRING PHYSICIAN: Ammy Conway MD  RETURN PHYSICIAN APPOINTMENT: TBD by patient      INITIAL ASSESSMENT:  Vicky Barajas has attended 1 physical therapy session including initial evaluation. Vicky Barajas presents with S/S of decreased ROM, decreased strength, impaired gait, impaired stairs, impaired transfers, and sleep disruption due to right hip pain. Vicky Barajas will benefit from skilled PT (medically necessary) to address above deficits affecting participation in basic ADLs and overall functional tolerance. PROBLEM LIST (Impacting functional limitations):  1. Decreased Strength  2. Decreased ADL/Functional Activities  3. Decreased Transfer Abilities  4. Decreased Ambulation Ability/Technique  5. Decreased Balance  6. Increased Pain  7. Decreased Activity Tolerance  8. Decreased Flexibility/Joint Mobility  9. Decreased Knowledge of Precautions  10. Decreased [place] with Home Exercise Program INTERVENTIONS PLANNED:  1. Balance Exercise  2. Bed Mobility  3. Cold  4. Cryotherapy  5. Electrical Stimulation  6. Family Education  7. Gait Training  8. Heat  9. Home Exercise Program (HEP)  10. Manual Therapy  11.  Neuromuscular Re-education/Strengthening  12. Range of Motion (ROM)  13. Therapeutic Activites  14. Therapeutic Exercise/Strengthening  15. Transfer Training  16. Ultrasound (US)   TREATMENT PLAN:  Effective Dates: 10/3/2017 TO 1/3/2018. Frequency/Duration: 2 times a week for 6 weeks  GOALS: (Goals have been discussed and agreed upon with patient.)  Short Term Goals 3 weeks  1. Giulia Mannt will be independent with HEP for strength and ROM  2. Giulia Mannt will participate in LE strengthening exercises for hip, knee, ankle with weight as appropriate for 3 sets of 10.  [address] will report <=5/10 pain with don/doffing clothing with minimal to no difficulty   4. Giulia Mannt will demonstrate improvement of MMT from initial eval to 4+/5 B LE in order to return to participate in ADLs with minimal to no difficulty. [address] will participate in static and dynamic balance activities for 5 minutes to help improve proprioception and decrease risk of falls   6. Giulia Mannt to increase lower extremity functional scale by 10 points to show improvement in areas of difficulty  Long Term Goals 8 weeks   1. Giulia Mannt will be independent in HEP of stretching and strengthening   2. Giulia Mannt will be able to perform sit to stand transfers independently with increased knee flexion and decreased use of upper extremities   3. Giulia Mannt will ascend/descend 12 steps with reciprocal gait pattern and rail   4. Andreeaylann Korina to increase lower extremity functional scale by 20 points to show improvement in areas of difficulty  Rehabilitation Potential For Stated Goals: Good  Regarding Delphine Debbie Andrea's therapy, I certify that the treatment plan above will be carried out by a therapist or under their direction.   Thank you for this referral,  Smita Ye PT     Referring Physician Signature: Dang Ansari MD Date                    HISTORY:   History of Present Injury/Illness (Reason for Referral):  Mr. Lilli Cisneros reports gradually increasing right hip pain, right lumbar pain, right hip stiffness, and difficulty with gait, balance, and stair negotiation with home entry. He reports difficulty with home care duties gait, and dressing. His goals are to improve his symptoms for decreased difficulty with going up stairs to check on his grandson who lives with him and has a diagnosis of epileptic seizures. Past Medical History/Comorbidities:   Mr. Lilli Cisneros  has a past medical history of Endocrine disease; Hypertension; and Other ill-defined conditions. Mr. Lilli Cisneros  has a past surgical history that includes appendectomy. Social History/Living Environment:     lives in a two story residence with his wife and his grandson (who he helps provide care for)  Prior Level of Function/Work/Activity:  Worked as a manual . Currently on disability    Current Medications:    Current Outpatient Prescriptions:     lisinopril (PRINIVIL, ZESTRIL) 10 mg tablet, Take 10 mg by mouth daily. , Disp: , Rfl:     levothyroxine (SYNTHROID) 125 mcg tablet, Take 125 mcg by mouth daily (before breakfast). , Disp: , Rfl:     clonazepam (KLONOPIN) 1 mg tablet, Take 1 mg by mouth two (2) times a day., Disp: , Rfl:     naproxen (NAPROSYN) 500 mg tablet, Take 500 mg by mouth as needed. , Disp: , Rfl:     fluoxetine (PROZAC) 10 mg capsule, Take 10 mg by mouth daily. , Disp: , Rfl:     oxycodone-acetaminophen (PERCOCET 7.5) 7.5-325 mg per tablet, Take 1 Tab by mouth every four (4) hours as needed for Pain for 15 doses. , Disp: 15 Tab, Rfl: 0     Date Last Reviewed:  10/19/2017   Number of Personal Factors/Comorbidities that affect the Plan of Care: 3+: HIGH COMPLEXITY   EXAMINATION:   Observation/Orthostatic Postural Assessment:  Assessed @ Initial Visit: ambulates with antalgic pattern on right with flexed hip position and mild right trendelenberg. Palpation:  Assessed @ Initial Visit: Tenderness to palpation over the right glut and lumbar paraspinals  ROM: Assessed @ Initial Visit:  Limited right quad and right hamstring ROM  Right hip flexion 95 with hard stop   Right hip extension -10   Right hip abd 20   Right hip ER 20   Right hip IR 5   Lumbar spinal flexion 50%   Lumbar extension 25%                     Strength: Assessed @ Initial Visit   Knee flexion-left 5   Knee flexion-right  5   Knee extension-left 5   Knee extension-right 5   Hip flexion-left 5   Hip flexion-right 4   Hip abduction-left 5   Hip abduction-right 3+   Hip extension-left 4+   Hip extension-right 4   Ankle dorsiflexion-left  5   Ankle dorsiflexion-right 5   Great toe extension-left 5   Great toe extension-right 5   Ankle plantarflexion-left 5   Ankle plantarflexion-right 5       Special Tests: Assessed @ Initial Visit    Giovana's Test: +right   Scouring Test:  +right     Neurological Screen: Assessed @ Initial Visit: decreased straight leg raise with pain in posterior thigh on right  Functional Mobility:  Assessed @ Initial Visit: impaired sit-stand transfer for chair and supine-sit in bed. Balance:  Assessed @ Initial Visit: impaired single limb stance on right     Body Structures Involved:  1. Nerves  2. Joints  3. Muscles  4. Ligaments Body Functions Affected:  1. Sensory/Pain  2. Neuromusculoskeletal  3. Movement Related Activities and Participation Affected:  1. General Tasks and Demands  2. Mobility  3. Self Care  4. Domestic Life  5. Interpersonal Interactions and Relationships  6. Community, Social and [place]   Number of elements that affect the Plan of Care: 3: MODERATE COMPLEXITY   CLINICAL PRESENTATION:   Presentation: Evolving clinical presentation with changing clinical characteristics: MODERATE COMPLEXITY   CLINICAL DECISION MAKING:   Outcome Measure:    Tool Used: Lower Extremity Functional Scale (LEFS)  Score:  Initial: 73/80 Most Recent: X/80 (Date: -- )   Interpretation of Score: 20 questions each scored on a 5 point scale with 0 representing \"extreme difficulty or unable to perform\" and 4 representing \"no difficulty\". The lower the score, the greater the functional disability. 80/80 represents no disability. Minimal detectable change is 9 points. Score 80 79-63 62-48 47-32 31-16 15-1 0   Modifier CH CI CJ CK CL CM CN     ? Mobility - Walking and Moving Around:     - CURRENT STATUS: CI - 1%-19% impaired, limited or restricted    - GOAL STATUS: CH - 0% impaired, limited or restricted    - D/C STATUS:  ---------------To be determined---------------      Medical Necessity:   · Skilled intervention continues to be required due to deficits and impairments seen upon initial evaluation affecting patient's participation in ADLs and functional tasks. Reason for Services/Other Comments:  · Patient continues to require skilled intervention due to deficits and impairments seen upon initial evaluation affecting patient's participation in ADLs and functional tasks. Use of outcome tool(s) and clinical judgement create a POC that gives a: Clear prediction of patient's progress: LOW COMPLEXITY   TREATMENT:   (In addition to Assessment/Re-Assessment sessions the following treatments were rendered)  THERAPEUTIC EXERCISE: (40 minutes):  Exercises per grid below to improve mobility, strength and balance. Required minimal visual, verbal and manual cues to promote proper body alignment, promote proper body posture and promote proper body mechanics. Progressed resistance, range and complexity of movement as indicated.    Date:  10/3/2017 Date:  10/5/2017 Date:  10/12/2017 Date:  10/17/2017 Date:  10/19/2017   Activity/Exercise Parameters Parameters Parameters     Bridge with adduction 2x10 2x10 2x10 2x10 2x10   Prone hip ext with pillow in neutral 2x10 2x10 2x10 2x10 2x10   Sidelying clamshell 2x10 2x10@ 2# [email or web address] 2x10@2# 2x10 2#   Prone quad stretch 3x30\" 3x30\" 3x30\" 3x30\" 3x30\"   Supine delfino stretch 3x30\" 3x30\" 3x30\" 3x30\" 3x30\"   Supine hip traction x5'       Nu-step  x10' X10\" l2 x10' L2 x10' L2 with moist heat pack to lumbar spine   Step ups   6\" 2x10B 6\" 2x10B 8\" 2x10B   Balance board   x2'ea x2'ea x2'ea   Standing hip ext and abd   ytb 2x10ea ytb x20ea+ flexion and adduction ytb x20ea flexion and adduction, abduction   Tandem balance    2x1'ea 2x1'ea                       MANUAL THERAPY: (0 minutes): Joint mobilization, Soft tissue mobilization and Manipulation was utilized and necessary because of the patient's restricted joint motion and restricted motion of soft tissue. Right hip lateral joint distraction with physiologic hip IR and ER. Right lateral hip inferior glide with physiologic flexion  MODALITIES: ( minutes):      Pre-Treatment Assessment: Patient reports that he has been feeling pretty good overall. Treatment/Session Assessment:Patient had improved stiffness with gait after treatment. · Pain/ Symptoms: Initial:   4/10 Post Session:  2/10 ·   Compliance with Program/Exercises: Will assess as treatment progresses. · Recommendations/Intent for next treatment session: \"Next visit will focus on advancements to more challenging activities\".   Total Treatment Duration:PT Patient Time In/Time Out  Time In: 1430  Time Out: 16 [facility], PT

## 2017-10-26 ENCOUNTER — HOSPITAL ENCOUNTER (OUTPATIENT)
Dept: PHYSICAL THERAPY | Age: 57
Discharge: HOME | End: 2017-10-26
Payer: OTHER GOVERNMENT

## 2017-10-26 PROCEDURE — 97110 THERAPEUTIC EXERCISES: CPT

## 2017-10-26 NOTE — PROGRESS NOTES
Sherry Kay  : 1960 [facility], [facility], [address]  [phone]   [phone]              OUTPATIENT PHYSICAL THERAPY:Daily Note 10/26/2017    ICD-10: Treatment Diagnosis:   Pain in right hip (M25.551)  Other abnormalities of gait and mobility (R26.89)   Stiffness of right hip, not elsewhere classified (M25.651)     Low back pain (M54.5)      Precautions/Allergies:   Review of patient's allergies indicates no known allergies. Fall Risk Score: 3 (? 5 = High Risk)  MD Orders: Eval and Treat  MEDICAL/REFERRING DIAGNOSIS:  Pain in unspecified hip [M25.559]  Low back pain [M54.5]   DATE OF ONSET: long term with exacerbation over the past 7-8 years  REFERRING PHYSICIAN: Julieta Monteiro MD  RETURN PHYSICIAN APPOINTMENT: TBD by patient      INITIAL ASSESSMENT:  Sherry Kay has attended 1 physical therapy session including initial evaluation. Sherry Kay presents with S/S of decreased ROM, decreased strength, impaired gait, impaired stairs, impaired transfers, and sleep disruption due to right hip pain. Sherry Kay will benefit from skilled PT (medically necessary) to address above deficits affecting participation in basic ADLs and overall functional tolerance. PROBLEM LIST (Impacting functional limitations):  1. Decreased Strength  2. Decreased ADL/Functional Activities  3. Decreased Transfer Abilities  4. Decreased Ambulation Ability/Technique  5. Decreased Balance  6. Increased Pain  7. Decreased Activity Tolerance  8. Decreased Flexibility/Joint Mobility  9. Decreased Knowledge of Precautions  10. Decreased [place] with Home Exercise Program INTERVENTIONS PLANNED:  1. Balance Exercise  2. Bed Mobility  3. Cold  4. Cryotherapy  5. Electrical Stimulation  6. Family Education  7. Gait Training  8. Heat  9. Home Exercise Program (HEP)  10. Manual Therapy  11.  Neuromuscular Re-education/Strengthening  12. Range of Motion (ROM)  13. Therapeutic Activites  14. Therapeutic Exercise/Strengthening  15. Transfer Training  16. Ultrasound (US)   TREATMENT PLAN:  Effective Dates: 10/3/2017 TO 1/3/2018. Frequency/Duration: 2 times a week for 6 weeks  GOALS: (Goals have been discussed and agreed upon with patient.)  Short Term Goals 3 weeks  1. Gianna Ruelas will be independent with HEP for strength and ROM  2. Gianna Ruelas will participate in LE strengthening exercises for hip, knee, ankle with weight as appropriate for 3 sets of 10.  [address] will report <=5/10 pain with don/doffing clothing with minimal to no difficulty   4. Gianna Ruelas will demonstrate improvement of MMT from initial eval to 4+/5 B LE in order to return to participate in ADLs with minimal to no difficulty. [address] will participate in static and dynamic balance activities for 5 minutes to help improve proprioception and decrease risk of falls   6. Gianna Ruelas to increase lower extremity functional scale by 10 points to show improvement in areas of difficulty  Long Term Goals 8 weeks   1. Gianna Ruelas will be independent in HEP of stretching and strengthening   2. Gianna Ruelas will be able to perform sit to stand transfers independently with increased knee flexion and decreased use of upper extremities   3. Gianna Ruelas will ascend/descend 12 steps with reciprocal gait pattern and rail   4. Gianna Ruelas to increase lower extremity functional scale by 20 points to show improvement in areas of difficulty  Rehabilitation Potential For Stated Goals: Good  Regarding Lucas Andrea's therapy, I certify that the treatment plan above will be carried out by a therapist or under their direction.   Thank you for this referral,  Shirin Weldon PT     Referring Physician Signature: Zenaida Dong MD Date                    HISTORY:   History of Present Injury/Illness (Reason for Referral):  Mr. Kirk Braun reports gradually increasing right hip pain, right lumbar pain, right hip stiffness, and difficulty with gait, balance, and stair negotiation with home entry. He reports difficulty with home care duties gait, and dressing. His goals are to improve his symptoms for decreased difficulty with going up stairs to check on his grandson who lives with him and has a diagnosis of epileptic seizures. Past Medical History/Comorbidities:   Mr. Kirk Braun  has a past medical history of Endocrine disease; Hypertension; and Other ill-defined conditions. Mr. Kirk Braun  has a past surgical history that includes appendectomy. Social History/Living Environment:     lives in a two story residence with his wife and his grandson (who he helps provide care for)  Prior Level of Function/Work/Activity:  Worked as a manual . Currently on disability    Current Medications:    Current Outpatient Prescriptions:     lisinopril (PRINIVIL, ZESTRIL) 10 mg tablet, Take 10 mg by mouth daily. , Disp: , Rfl:     levothyroxine (SYNTHROID) 125 mcg tablet, Take 125 mcg by mouth daily (before breakfast). , Disp: , Rfl:     clonazepam (KLONOPIN) 1 mg tablet, Take 1 mg by mouth two (2) times a day., Disp: , Rfl:     naproxen (NAPROSYN) 500 mg tablet, Take 500 mg by mouth as needed. , Disp: , Rfl:     fluoxetine (PROZAC) 10 mg capsule, Take 10 mg by mouth daily. , Disp: , Rfl:     oxycodone-acetaminophen (PERCOCET 7.5) 7.5-325 mg per tablet, Take 1 Tab by mouth every four (4) hours as needed for Pain for 15 doses. , Disp: 15 Tab, Rfl: 0     Date Last Reviewed:  10/26/2017   Number of Personal Factors/Comorbidities that affect the Plan of Care: 3+: HIGH COMPLEXITY   EXAMINATION:   Observation/Orthostatic Postural Assessment:  Assessed @ Initial Visit: ambulates with antalgic pattern on right with flexed hip position and mild right trendelenberg. Palpation:  Assessed @ Initial Visit: Tenderness to palpation over the right glut and lumbar paraspinals  ROM: Assessed @ Initial Visit:  Limited right quad and right hamstring ROM  Right hip flexion 95 with hard stop   Right hip extension -10   Right hip abd 20   Right hip ER 20   Right hip IR 5   Lumbar spinal flexion 50%   Lumbar extension 25%                     Strength: Assessed @ Initial Visit   Knee flexion-left 5   Knee flexion-right  5   Knee extension-left 5   Knee extension-right 5   Hip flexion-left 5   Hip flexion-right 4   Hip abduction-left 5   Hip abduction-right 3+   Hip extension-left 4+   Hip extension-right 4   Ankle dorsiflexion-left  5   Ankle dorsiflexion-right 5   Great toe extension-left 5   Great toe extension-right 5   Ankle plantarflexion-left 5   Ankle plantarflexion-right 5       Special Tests: Assessed @ Initial Visit    Giovana's Test: +right   Scouring Test:  +right     Neurological Screen: Assessed @ Initial Visit: decreased straight leg raise with pain in posterior thigh on right  Functional Mobility:  Assessed @ Initial Visit: impaired sit-stand transfer for chair and supine-sit in bed. Balance:  Assessed @ Initial Visit: impaired single limb stance on right     Body Structures Involved:  1. Nerves  2. Joints  3. Muscles  4. Ligaments Body Functions Affected:  1. Sensory/Pain  2. Neuromusculoskeletal  3. Movement Related Activities and Participation Affected:  1. General Tasks and Demands  2. Mobility  3. Self Care  4. Domestic Life  5. Interpersonal Interactions and Relationships  6. Community, Social and [place]   Number of elements that affect the Plan of Care: 3: MODERATE COMPLEXITY   CLINICAL PRESENTATION:   Presentation: Evolving clinical presentation with changing clinical characteristics: MODERATE COMPLEXITY   CLINICAL DECISION MAKING:   Outcome Measure:    Tool Used: Lower Extremity Functional Scale (LEFS)  Score:  Initial: 73/80 Most Recent: X/80 (Date: -- )   Interpretation of Score: 20 questions each scored on a 5 point scale with 0 representing \"extreme difficulty or unable to perform\" and 4 representing \"no difficulty\". The lower the score, the greater the functional disability. 80/80 represents no disability. Minimal detectable change is 9 points. Score 80 79-63 62-48 47-32 31-16 15-1 0   Modifier CH CI CJ CK CL CM CN     ? Mobility - Walking and Moving Around:     - CURRENT STATUS: CI - 1%-19% impaired, limited or restricted    - GOAL STATUS: CH - 0% impaired, limited or restricted    - D/C STATUS:  ---------------To be determined---------------      Medical Necessity:   · Skilled intervention continues to be required due to deficits and impairments seen upon initial evaluation affecting patient's participation in ADLs and functional tasks. Reason for Services/Other Comments:  · Patient continues to require skilled intervention due to deficits and impairments seen upon initial evaluation affecting patient's participation in ADLs and functional tasks. Use of outcome tool(s) and clinical judgement create a POC that gives a: Clear prediction of patient's progress: LOW COMPLEXITY   TREATMENT:   (In addition to Assessment/Re-Assessment sessions the following treatments were rendered)  THERAPEUTIC EXERCISE: (40 minutes):  Exercises per grid below to improve mobility, strength and balance. Required minimal visual, verbal and manual cues to promote proper body alignment, promote proper body posture and promote proper body mechanics. Progressed resistance, range and complexity of movement as indicated.    Date:  10/3/2017 Date:  10/5/2017 Date:  10/12/2017 Date:  10/17/2017 Date:  10/19/2017 Date:  10/26/2017   Activity/Exercise Parameters Parameters Parameters      Bridge with adduction 2x10 2x10 2x10 2x10 2x10 2x10   Prone hip ext with pillow in neutral 2x10 2x10 2x10 2x10 2x10 2x10   Sidelying clamshell 2x10 2x10@ 2# [email or web address] 2x10@2# 2x10 2# Prone quad stretch 3x30\" 3x30\" 3x30\" 3x30\" 3x30\" 3x30\"   Supine delfino stretch 3x30\" 3x30\" 3x30\" 3x30\" 3x30\" 3x30\"   Supine hip traction x5'        Nu-step  x10' X10\" l2 x10' L2 x10' L2 with moist heat pack to lumbar spine x10' L2 with moist heat pack to lumbar spine   Step ups   6\" 2x10B 6\" 2x10B 8\" 2x10B x8' 2x10b   Balance board   x2'ea x2'ea x2'ea x2'ea    Standing hip ext and abd   ytb 2x10ea ytb x20ea+ flexion and adduction ytb x20ea flexion and adduction, abduction    Tandem balance    2x1'ea 2x1'ea    PROM      x10'   Sidesteps and monster walks      [id number] each RTB       MANUAL THERAPY: (0 minutes): Joint mobilization, Soft tissue mobilization and Manipulation was utilized and necessary because of the patient's restricted joint motion and restricted motion of soft tissue. Right hip lateral joint distraction with physiologic hip IR and ER. Right lateral hip inferior glide with physiologic flexion  MODALITIES: ( minutes):      Pre-Treatment Assessment: Patient reports feeling better overall for hip pain, but has been moving less due to illness this week. Treatment/Session Assessment:Patient had improved stiffness with gait after treatment. · Pain/ Symptoms: Initial:   5/10 Post Session:  1/10 ·   Compliance with Program/Exercises: Will assess as treatment progresses. · Recommendations/Intent for next treatment session: \"Next visit will focus on advancements to more challenging activities\".   Total Treatment Duration:PT Patient Time In/Time Out  Time In: 1430  Time Out: 16 [facility], PT

## 2017-11-03 ENCOUNTER — HOSPITAL ENCOUNTER (OUTPATIENT)
Dept: PHYSICAL THERAPY | Age: 57
Discharge: HOME | End: 2017-11-03
Payer: OTHER GOVERNMENT

## 2017-11-03 PROCEDURE — 97110 THERAPEUTIC EXERCISES: CPT

## 2017-11-03 NOTE — PROGRESS NOTES
Erin Mota  : 1960 [facility] at [facility]  [address] [facility], [address] [place] [address], [address]  [phone]   [phone]              OUTPATIENT PHYSICAL THERAPY:Daily Note 11/3/2017    ICD-10: Treatment Diagnosis:   Pain in right hip (M25.551)  Other abnormalities of gait and mobility (R26.89)   Stiffness of right hip, not elsewhere classified (M25.651)     Low back pain (M54.5)      Precautions/Allergies:   Review of patient's allergies indicates no known allergies. Fall Risk Score: 3 (? 5 = High Risk)  MD Orders: Eval and Treat  MEDICAL/REFERRING DIAGNOSIS:  Pain in unspecified hip [M25.559]  Low back pain [M54.5]   DATE OF ONSET: long term with exacerbation over the past 7-8 years  REFERRING PHYSICIAN: Lilibeth Laura MD  RETURN PHYSICIAN APPOINTMENT: TBD by patient      INITIAL ASSESSMENT:  Erin Mota has attended 1 physical therapy session including initial evaluation. Erin Mota presents with S/S of decreased ROM, decreased strength, impaired gait, impaired stairs, impaired transfers, and sleep disruption due to right hip pain. Erin Mota will benefit from skilled PT (medically necessary) to address above deficits affecting participation in basic ADLs and overall functional tolerance. PROBLEM LIST (Impacting functional limitations):  1. Decreased Strength  2. Decreased ADL/Functional Activities  3. Decreased Transfer Abilities  4. Decreased Ambulation Ability/Technique  5. Decreased Balance  6. Increased Pain  7. Decreased Activity Tolerance  8. Decreased Flexibility/Joint Mobility  9. Decreased Knowledge of Precautions  10. Decreased [place] with Home Exercise Program INTERVENTIONS PLANNED:  1. Balance Exercise  2. Bed Mobility  3. Cold  4. Cryotherapy  5. Electrical Stimulation  6. Family Education  7. Gait Training  8. Heat  9. Home Exercise Program (HEP)  10. Manual Therapy  11.  Neuromuscular Re-education/Strengthening  12. Range of Motion (ROM)  13. Therapeutic Activites  14. Therapeutic Exercise/Strengthening  15. Transfer Training  16. Ultrasound (US)   TREATMENT PLAN:  Effective Dates: 10/3/2017 TO 1/3/2018. Frequency/Duration: 2 times a week for 6 weeks  GOALS: (Goals have been discussed and agreed upon with patient.)  Short Term Goals 3 weeks  1. Edna Ortega will be independent with HEP for strength and ROM  2. Edna Ortega will participate in LE strengthening exercises for hip, knee, ankle with weight as appropriate for 3 sets of 10.  [address] will report <=5/10 pain with don/doffing clothing with minimal to no difficulty   4. Edna Ortega will demonstrate improvement of MMT from initial eval to 4+/5 B LE in order to return to participate in ADLs with minimal to no difficulty. [address] will participate in static and dynamic balance activities for 5 minutes to help improve proprioception and decrease risk of falls   6. Edna Ortega to increase lower extremity functional scale by 10 points to show improvement in areas of difficulty  Long Term Goals 8 weeks   1. Edna Ortega will be independent in HEP of stretching and strengthening   2. Edna Ortega will be able to perform sit to stand transfers independently with increased knee flexion and decreased use of upper extremities   3. Edna Ortega will ascend/descend 12 steps with reciprocal gait pattern and rail   4. Edna Ortega to increase lower extremity functional scale by 20 points to show improvement in areas of difficulty  Rehabilitation Potential For Stated Goals: Good  Regarding Arnie Esparza Emre's therapy, I certify that the treatment plan above will be carried out by a therapist or under their direction.   Thank you for this referral,  Sindy Baron PT     Referring Physician Signature: Luli Tejeda MD Date                    HISTORY:   History of Present Injury/Illness (Reason for Referral):  Mr. Mayra Contreras reports gradually increasing right hip pain, right lumbar pain, right hip stiffness, and difficulty with gait, balance, and stair negotiation with home entry. He reports difficulty with home care duties gait, and dressing. His goals are to improve his symptoms for decreased difficulty with going up stairs to check on his grandson who lives with him and has a diagnosis of epileptic seizures. Past Medical History/Comorbidities:   Mr. Mayra Contreras  has a past medical history of Endocrine disease; Hypertension; and Other ill-defined conditions. Mr. Mayra Contreras  has a past surgical history that includes appendectomy. Social History/Living Environment:     lives in a two story residence with his wife and his grandson (who he helps provide care for)  Prior Level of Function/Work/Activity:  Worked as a manual . Currently on disability    Current Medications:    Current Outpatient Prescriptions:     lisinopril (PRINIVIL, ZESTRIL) 10 mg tablet, Take 10 mg by mouth daily. , Disp: , Rfl:     levothyroxine (SYNTHROID) 125 mcg tablet, Take 125 mcg by mouth daily (before breakfast). , Disp: , Rfl:     clonazepam (KLONOPIN) 1 mg tablet, Take 1 mg by mouth two (2) times a day., Disp: , Rfl:     naproxen (NAPROSYN) 500 mg tablet, Take 500 mg by mouth as needed. , Disp: , Rfl:     fluoxetine (PROZAC) 10 mg capsule, Take 10 mg by mouth daily. , Disp: , Rfl:     oxycodone-acetaminophen (PERCOCET 7.5) 7.5-325 mg per tablet, Take 1 Tab by mouth every four (4) hours as needed for Pain for 15 doses. , Disp: 15 Tab, Rfl: 0     Date Last Reviewed:  11/3/2017   Number of Personal Factors/Comorbidities that affect the Plan of Care: 3+: HIGH COMPLEXITY   EXAMINATION:   Observation/Orthostatic Postural Assessment:  Assessed @ Initial Visit: ambulates with antalgic pattern on right with flexed hip position and mild right trendelenberg. Palpation:  Assessed @ Initial Visit: Tenderness to palpation over the right glut and lumbar paraspinals  ROM: Assessed @ Initial Visit:  Limited right quad and right hamstring ROM  Right hip flexion 95 with hard stop   Right hip extension -10   Right hip abd 20   Right hip ER 20   Right hip IR 5   Lumbar spinal flexion 50%   Lumbar extension 25%                     Strength: Assessed @ Initial Visit   Knee flexion-left 5   Knee flexion-right  5   Knee extension-left 5   Knee extension-right 5   Hip flexion-left 5   Hip flexion-right 4   Hip abduction-left 5   Hip abduction-right 3+   Hip extension-left 4+   Hip extension-right 4   Ankle dorsiflexion-left  5   Ankle dorsiflexion-right 5   Great toe extension-left 5   Great toe extension-right 5   Ankle plantarflexion-left 5   Ankle plantarflexion-right 5       Special Tests: Assessed @ Initial Visit    Giovana's Test: +right   Scouring Test:  +right     Neurological Screen: Assessed @ Initial Visit: decreased straight leg raise with pain in posterior thigh on right  Functional Mobility:  Assessed @ Initial Visit: impaired sit-stand transfer for chair and supine-sit in bed. Balance:  Assessed @ Initial Visit: impaired single limb stance on right     Body Structures Involved:  1. Nerves  2. Joints  3. Muscles  4. Ligaments Body Functions Affected:  1. Sensory/Pain  2. Neuromusculoskeletal  3. Movement Related Activities and Participation Affected:  1. General Tasks and Demands  2. Mobility  3. Self Care  4. Domestic Life  5. Interpersonal Interactions and Relationships  6. Community, Social and [place]   Number of elements that affect the Plan of Care: 3: MODERATE COMPLEXITY   CLINICAL PRESENTATION:   Presentation: Evolving clinical presentation with changing clinical characteristics: MODERATE COMPLEXITY   CLINICAL DECISION MAKING:   Outcome Measure:    Tool Used: Lower Extremity Functional Scale (LEFS)  Score:  Initial: 73/80 Most Recent: X/80 (Date: -- )   Interpretation of Score: 20 questions each scored on a 5 point scale with 0 representing \"extreme difficulty or unable to perform\" and 4 representing \"no difficulty\". The lower the score, the greater the functional disability. 80/80 represents no disability. Minimal detectable change is 9 points. Score 80 79-63 62-48 47-32 31-16 15-1 0   Modifier CH CI CJ CK CL CM CN     ? Mobility - Walking and Moving Around:     - CURRENT STATUS: CI - 1%-19% impaired, limited or restricted    - GOAL STATUS: CH - 0% impaired, limited or restricted    - D/C STATUS:  ---------------To be determined---------------      Medical Necessity:   · Skilled intervention continues to be required due to deficits and impairments seen upon initial evaluation affecting patient's participation in ADLs and functional tasks. Reason for Services/Other Comments:  · Patient continues to require skilled intervention due to deficits and impairments seen upon initial evaluation affecting patient's participation in ADLs and functional tasks. Use of outcome tool(s) and clinical judgement create a POC that gives a: Clear prediction of patient's progress: LOW COMPLEXITY   TREATMENT:   (In addition to Assessment/Re-Assessment sessions the following treatments were rendered)  THERAPEUTIC EXERCISE: (40 minutes):  Exercises per grid below to improve mobility, strength and balance. Required minimal visual, verbal and manual cues to promote proper body alignment, promote proper body posture and promote proper body mechanics. Progressed resistance, range and complexity of movement as indicated.    Date:  10/3/2017 Date:  10/5/2017 Date:  10/12/2017 Date:  10/17/2017 Date:  10/19/2017 Date:  10/26/2017 Date:  11/3/2017   Activity/Exercise Parameters Parameters Parameters       Bridge with adduction 2x10 2x10 2x10 2x10 2x10 2x10 2x10   Prone hip ext with pillow in neutral 2x10 2x10 2x10 2x10 2x10 2x10 2x10   Sidelying clamshell 2x10 2x10@ 2# [email or web address] 2x10@2# 2x10 2#     Prone quad stretch 3x30\" 3x30\" 3x30\" 3x30\" 3x30\" 3x30\" 3x30\"   Supine delfino stretch 3x30\" 3x30\" 3x30\" 3x30\" 3x30\" 3x30\" 3x30\"   Supine hip traction x5'         Nu-step  x10' X10\" l2 x10' L2 x10' L2 with moist heat pack to lumbar spine x10' L2 with moist heat pack to lumbar spine x10' L2 with moist heat pack to lumbar spine   Step ups   6\" 2x10B 6\" 2x10B 8\" 2x10B x8' 2x10b x8' 2x10b   Balance board   x2'ea x2'ea x2'ea x2'ea  x2'ea   Standing hip ext and abd   ytb 2x10ea ytb x20ea+ flexion and adduction ytb x20ea flexion and adduction, abduction     Tandem balance    2x1'ea 2x1'ea     PROM      x10' x10'   Sidesteps and monster walks      [id number] each RTB 3x20ft x rtb       MANUAL THERAPY: (0 minutes): Joint mobilization, Soft tissue mobilization and Manipulation was utilized and necessary because of the patient's restricted joint motion and restricted motion of soft tissue. Right hip lateral joint distraction with physiologic hip IR and ER. Right lateral hip inferior glide with physiologic flexion  MODALITIES: ( minutes):      Pre-Treatment Assessment: Patient reports feeling better with improved ROM. Treatment/Session Assessment:Patient had improved stiffness with gait after treatment. · Pain/ Symptoms: Initial:   4/10 Post Session:  1/10 ·   Compliance with Program/Exercises: Will assess as treatment progresses. · Recommendations/Intent for next treatment session: \"Next visit will focus on advancements to more challenging activities\".   Total Treatment Duration:PT Patient Time In/Time Out  Time In: 1430  Time Out: 16 [facility], PT

## 2018-01-10 NOTE — THERAPY DISCHARGE
Missy Navarro  : 1960 [facility] Center at [address] [place] [address] [address], [facility], [address] [facility]  [phone]   [phone]              OUTPATIENT PHYSICAL THERAPY:Discontinuation Summary 1/10/2018    ICD-10: Treatment Diagnosis:   Pain in right hip (M25.551)  Other abnormalities of gait and mobility (R26.89)   Stiffness of right hip, not elsewhere classified (M25.651)     Low back pain (M54.5)      Precautions/Allergies:   Review of patient's allergies indicates no known allergies. Fall Risk Score: 3 (? 5 = High Risk)  MD Orders: Eval and Treat  MEDICAL/REFERRING DIAGNOSIS:  Pain in unspecified hip [M25.559]  Low back pain [M54.5]   DATE OF ONSET: long term with exacerbation over the past 7-8 years  REFERRING PHYSICIAN: Nae Boyce MD  RETURN PHYSICIAN APPOINTMENT: TBD by patient      INITIAL ASSESSMENT:  Missy Navarro has attended 7 physical therapy session including initial evaluation. Missy Navarro presented with S/S of decreased ROM, decreased strength, impaired gait, impaired stairs, impaired transfers, and sleep disruption due to right hip pain. He had some post treatment improvement with symptoms, but minimal retention of pain decrease after treatment. Missy Navarro had three no-shows and will be discontinued from therapy at this time. PROBLEM LIST (Impacting functional limitations):  1. Decreased Strength  2. Decreased ADL/Functional Activities  3. Decreased Transfer Abilities  4. Decreased Ambulation Ability/Technique  5. Decreased Balance  6. Increased Pain  7. Decreased Activity Tolerance  8. Decreased Flexibility/Joint Mobility  9. Decreased Knowledge of Precautions  10. Decreased [place] with Home Exercise Program INTERVENTIONS PLANNED:  1. Balance Exercise  2. Bed Mobility  3. Cold  4. Cryotherapy  5. Electrical Stimulation  6. Family Education  7. Gait Training  8. Heat  9.  Home Exercise Program (HEP)  10. Manual Therapy  11. Neuromuscular Re-education/Strengthening  12. Range of Motion (ROM)  13. Therapeutic Activites  14. Therapeutic Exercise/Strengthening  15. Transfer Training  16. Ultrasound (US)   TREATMENT PLAN:  Effective Dates: 10/3/2017 TO 1/3/2018. Frequency/Duration: 2 times a week for 6 weeks  GOALS: (Goals have been discussed and agreed upon with patient.)  Short Term Goals 3 weeks  1. Missy Navarro will be independent with HEP for strength and ROM. Met  2. Missy Navarro will participate in LE strengthening exercises for hip, knee, ankle with weight as appropriate for 3 sets of 10. Met  3. Missy Navarro will report <=5/10 pain with don/doffing clothing with minimal to no difficulty. Not met   4. Missy Navarro will demonstrate improvement of MMT from initial eval to 4+/5 B LE in order to return to participate in ADLs with minimal to no difficulty. Not met  5. Missy Navarro will participate in static and dynamic balance activities for 5 minutes to help improve proprioception and decrease risk of falls. Not met   6. Missy Navarro to increase lower extremity functional scale by 10 points to show improvement in areas of difficulty. Not met  Long Term Goals 8 weeks   1. Missy Navarro will be independent in HEP of stretching and strengthening. Not met   2. Missy Navarro will be able to perform sit to stand transfers independently with increased knee flexion and decreased use of upper extremities. Not met   3. Missy Navarro will ascend/descend 12 steps with reciprocal gait pattern and rail. Not met   4. Missy Navarro to increase lower extremity functional scale by 20 points to show improvement in areas of difficulty.  Not met  Rehabilitation Potential For Stated Goals: Good  Regarding Pily Andrea's therapy, I certify that the treatment plan above will be carried out by a therapist or under their direction. Thank you for this referral,  Xena Jenkins PT     Referring Physician Signature: Genaro Amin MD              Date                    HISTORY:   History of Present Injury/Illness (Reason for Referral):  Mr. Tim Granado reports gradually increasing right hip pain, right lumbar pain, right hip stiffness, and difficulty with gait, balance, and stair negotiation with home entry. He reports difficulty with home care duties gait, and dressing. His goals are to improve his symptoms for decreased difficulty with going up stairs to check on his grandson who lives with him and has a diagnosis of epileptic seizures. Past Medical History/Comorbidities:   Mr. Tim Granado  has a past medical history of Endocrine disease; Hypertension; and Other ill-defined conditions. Mr. Tim Granado  has a past surgical history that includes hx appendectomy. Social History/Living Environment:     lives in a two story residence with his wife and his grandson (who he helps provide care for)  Prior Level of Function/Work/Activity:  Worked as a manual . Currently on disability    Current Medications:    Current Outpatient Prescriptions:     lisinopril (PRINIVIL, ZESTRIL) 10 mg tablet, Take 10 mg by mouth daily. , Disp: , Rfl:     levothyroxine (SYNTHROID) 125 mcg tablet, Take 125 mcg by mouth daily (before breakfast). , Disp: , Rfl:     clonazepam (KLONOPIN) 1 mg tablet, Take 1 mg by mouth two (2) times a day., Disp: , Rfl:     naproxen (NAPROSYN) 500 mg tablet, Take 500 mg by mouth as needed. , Disp: , Rfl:     fluoxetine (PROZAC) 10 mg capsule, Take 10 mg by mouth daily. , Disp: , Rfl:     oxycodone-acetaminophen (PERCOCET 7.5) 7.5-325 mg per tablet, Take 1 Tab by mouth every four (4) hours as needed for Pain for 15 doses. , Disp: 15 Tab, Rfl: 0     Date Last Reviewed:  1/10/2018   Number of Personal Factors/Comorbidities that affect the Plan of Care: 3+: HIGH COMPLEXITY   EXAMINATION:   Observation/Orthostatic Postural Assessment:  Assessed @ Initial Visit: ambulates with antalgic pattern on right with flexed hip position and mild right trendelenberg. Palpation:  Assessed @ Initial Visit: Tenderness to palpation over the right glut and lumbar paraspinals  ROM: Assessed @ Initial Visit:  Limited right quad and right hamstring ROM  Right hip flexion 95 with hard stop   Right hip extension -10   Right hip abd 20   Right hip ER 20   Right hip IR 5   Lumbar spinal flexion 50%   Lumbar extension 25%                     Strength: Assessed @ Initial Visit   Knee flexion-left 5   Knee flexion-right  5   Knee extension-left 5   Knee extension-right 5   Hip flexion-left 5   Hip flexion-right 4   Hip abduction-left 5   Hip abduction-right 3+   Hip extension-left 4+   Hip extension-right 4   Ankle dorsiflexion-left  5   Ankle dorsiflexion-right 5   Great toe extension-left 5   Great toe extension-right 5   Ankle plantarflexion-left 5   Ankle plantarflexion-right 5       Special Tests: Assessed @ Initial Visit    Giovana's Test: +right   Scouring Test:  +right     Neurological Screen: Assessed @ Initial Visit: decreased straight leg raise with pain in posterior thigh on right  Functional Mobility:  Assessed @ Initial Visit: impaired sit-stand transfer for chair and supine-sit in bed. Balance:  Assessed @ Initial Visit: impaired single limb stance on right     Body Structures Involved:  1. Nerves  2. Joints  3. Muscles  4. Ligaments Body Functions Affected:  1. Sensory/Pain  2. Neuromusculoskeletal  3. Movement Related Activities and Participation Affected:  1. General Tasks and Demands  2. Mobility  3. Self Care  4. Domestic Life  5. Interpersonal Interactions and Relationships  6.  Community, Social and [place]   Number of elements that affect the Plan of Care: 3: MODERATE COMPLEXITY   CLINICAL PRESENTATION:   Presentation: Evolving clinical presentation with changing clinical characteristics: [facility] 24 MAKING:   Outcome Measure: Tool Used: Lower Extremity Functional Scale (LEFS)  Score:  Initial: 73/80 Most Recent: X/80 (Date: -- )   Interpretation of Score: 20 questions each scored on a 5 point scale with 0 representing \"extreme difficulty or unable to perform\" and 4 representing \"no difficulty\". The lower the score, the greater the functional disability. 80/80 represents no disability. Minimal detectable change is 9 points. Score 80 79-63 62-48 47-32 31-16 15-1 0   Modifier CH CI CJ CK CL CM CN     ? Mobility - Walking and Moving Around:     - CURRENT STATUS: CI - 1%-19% impaired, limited or restricted    - GOAL STATUS: CH - 0% impaired, limited or restricted    - D/C STATUS:  ---------------To be determined---------------      Medical Necessity:   · Skilled intervention continues to be required due to deficits and impairments seen upon initial evaluation affecting patient's participation in ADLs and functional tasks. Reason for Services/Other Comments:  · Patient continues to require skilled intervention due to deficits and impairments seen upon initial evaluation affecting patient's participation in ADLs and functional tasks.    Use of outcome tool(s) and clinical judgement create a POC that gives a: Clear prediction of patient's progress: LOW COMPLEXITY               Thank You for this Referral!,    Natty Emmanuel, PT